# Patient Record
Sex: MALE | Race: OTHER | NOT HISPANIC OR LATINO | URBAN - METROPOLITAN AREA
[De-identification: names, ages, dates, MRNs, and addresses within clinical notes are randomized per-mention and may not be internally consistent; named-entity substitution may affect disease eponyms.]

---

## 2024-08-20 ENCOUNTER — HOSPITAL ENCOUNTER (OUTPATIENT)
Dept: RADIOLOGY | Facility: HOSPITAL | Age: 54
Discharge: HOME | End: 2024-08-20
Attending: PHYSICIAN ASSISTANT
Payer: COMMERCIAL

## 2024-08-20 ENCOUNTER — OFFICE VISIT (OUTPATIENT)
Dept: PRIMARY CARE CLINIC | Facility: CLINIC | Age: 54
End: 2024-08-20
Payer: COMMERCIAL

## 2024-08-20 VITALS
HEIGHT: 66 IN | DIASTOLIC BLOOD PRESSURE: 64 MMHG | OXYGEN SATURATION: 99 % | WEIGHT: 134.94 LBS | HEART RATE: 64 BPM | SYSTOLIC BLOOD PRESSURE: 98 MMHG | BODY MASS INDEX: 21.69 KG/M2

## 2024-08-20 DIAGNOSIS — J18.9 PNEUMONIA SYMPTOMS: ICD-10-CM

## 2024-08-20 DIAGNOSIS — F17.200 TOBACCO DEPENDENCE: ICD-10-CM

## 2024-08-20 DIAGNOSIS — J06.9 UPPER RESPIRATORY TRACT INFECTION, UNSPECIFIED TYPE: Primary | ICD-10-CM

## 2024-08-20 DIAGNOSIS — R53.83 FATIGUE, UNSPECIFIED TYPE: ICD-10-CM

## 2024-08-20 DIAGNOSIS — J45.909 REACTIVE AIRWAY DISEASE WITHOUT COMPLICATION, UNSPECIFIED ASTHMA SEVERITY, UNSPECIFIED WHETHER PERSISTENT: ICD-10-CM

## 2024-08-20 LAB
CTP QC/QA: YES
SARS-COV-2 RDRP RESP QL NAA+PROBE: NEGATIVE

## 2024-08-20 PROCEDURE — 99203 OFFICE O/P NEW LOW 30 MIN: CPT | Mod: S$GLB,,, | Performed by: PHYSICIAN ASSISTANT

## 2024-08-20 PROCEDURE — 3008F BODY MASS INDEX DOCD: CPT | Mod: CPTII,S$GLB,, | Performed by: PHYSICIAN ASSISTANT

## 2024-08-20 PROCEDURE — 87635 SARS-COV-2 COVID-19 AMP PRB: CPT | Mod: QW,S$GLB,, | Performed by: PHYSICIAN ASSISTANT

## 2024-08-20 PROCEDURE — 71046 X-RAY EXAM CHEST 2 VIEWS: CPT | Mod: TC,FY,PO

## 2024-08-20 PROCEDURE — 71046 X-RAY EXAM CHEST 2 VIEWS: CPT | Mod: 26,,, | Performed by: RADIOLOGY

## 2024-08-20 PROCEDURE — 1159F MED LIST DOCD IN RCRD: CPT | Mod: CPTII,S$GLB,, | Performed by: PHYSICIAN ASSISTANT

## 2024-08-20 PROCEDURE — 3074F SYST BP LT 130 MM HG: CPT | Mod: CPTII,S$GLB,, | Performed by: PHYSICIAN ASSISTANT

## 2024-08-20 PROCEDURE — 3078F DIAST BP <80 MM HG: CPT | Mod: CPTII,S$GLB,, | Performed by: PHYSICIAN ASSISTANT

## 2024-08-20 RX ORDER — LEVOFLOXACIN 750 MG/1
750 TABLET ORAL DAILY
Qty: 10 TABLET | Refills: 0 | Status: SHIPPED | OUTPATIENT
Start: 2024-08-20 | End: 2024-08-30

## 2024-08-20 RX ORDER — PREDNISONE 10 MG/1
TABLET ORAL
Qty: 18 TABLET | Refills: 0 | Status: SHIPPED | OUTPATIENT
Start: 2024-08-20

## 2024-08-20 NOTE — PROGRESS NOTES
Subjective     Patient ID: Cheo Robertson is a 54 y.o. male.    Chief Complaint: Fever (Pt states he has had fever and chills for 3 days/), Fatigue (Pt states he has been very fatigue for 3 days), and Pain (Pt states he has pain in his lung)    Fever   This is a new problem. The current episode started 3 days ago. The problem occurs constantly. The problem has been unchanged. He has not experienced a heat injury.The maximum temperature noted was 102 to 102.9 F. The temperature was taken using an oral thermometer. Associated symptoms include coughing, nausea, sleepiness and wheezing. Pertinent negatives include no abdominal pain, chest pain, congestion, sore throat or urinary pain. He has tried acetaminophen for the symptoms.   Fatigue  Associated symptoms include coughing, fatigue, a fever and nausea. Pertinent negatives include no abdominal pain, chest pain, congestion or sore throat.   Pain  Associated symptoms include coughing, fatigue, a fever and nausea. Pertinent negatives include no abdominal pain, chest pain, congestion or sore throat. The symptoms are aggravated by smoking. He has tried rest for the symptoms. The treatment provided no relief.     Review of Systems   Constitutional:  Positive for fatigue and fever.   HENT:  Negative for nasal congestion and sore throat.    Respiratory:  Positive for cough, shortness of breath and wheezing.    Cardiovascular:  Negative for chest pain.   Gastrointestinal:  Positive for nausea. Negative for abdominal pain.          Objective     Physical Exam  Vitals reviewed.   Constitutional:       Appearance: Normal appearance. He is diaphoretic.   HENT:      Head: Normocephalic.      Right Ear: Tympanic membrane, ear canal and external ear normal. There is no impacted cerumen.      Left Ear: Tympanic membrane, ear canal and external ear normal. There is no impacted cerumen.      Nose: Nose normal. No congestion or rhinorrhea.      Mouth/Throat:      Mouth: Mucous membranes  are moist.      Pharynx: No oropharyngeal exudate.   Cardiovascular:      Rate and Rhythm: Normal rate and regular rhythm.      Pulses: Normal pulses.      Heart sounds: Normal heart sounds.   Pulmonary:      Breath sounds: Wheezing and rhonchi present.   Abdominal:      General: Abdomen is flat. There is no distension.      Palpations: Abdomen is soft.      Tenderness: There is no abdominal tenderness.   Neurological:      Mental Status: He is alert.            Assessment and Plan     1. Upper respiratory tract infection, unspecified type  -     POCT COVID-19 Rapid Screening    2. Pneumonia symptoms  -     Comprehensive Metabolic Panel; Future; Expected date: 08/20/2024  -     CBC Auto Differential; Future; Expected date: 08/20/2024  -     X-Ray Chest PA And Lateral; Future; Expected date: 08/20/2024    3. Fatigue, unspecified type  -     T3, Free; Future; Expected date: 08/20/2024  -     T4, Free; Future; Expected date: 08/20/2024  -     TSH; Future; Expected date: 08/20/2024    4. Reactive airway disease without complication, unspecified asthma severity, unspecified whether persistent    5. Tobacco dependence    Other orders  -     levoFLOXacin (LEVAQUIN) 750 MG tablet; Take 1 tablet (750 mg total) by mouth once daily. for 10 days  Dispense: 10 tablet; Refill: 0  -     predniSONE (DELTASONE) 10 MG tablet; Take 3 daily for 3 days, then 2 daily for three days, then 1 daily for three days.  Dispense: 18 tablet; Refill: 0        I spent 30 minutes on this encounter, time includes face-to-face, chart review, documentation, test review and orders.           No follow-ups on file.

## 2024-08-23 ENCOUNTER — TELEPHONE (OUTPATIENT)
Dept: FAMILY MEDICINE | Facility: CLINIC | Age: 54
End: 2024-08-23
Payer: COMMERCIAL

## 2024-08-23 NOTE — TELEPHONE ENCOUNTER
----- Message from Warren Paredes III, PA-C sent at 8/22/2024  7:10 AM CDT -----  I reviewed the lab work. The WBC was significantly elevated. If flores is not feeling significantly better, I recommend that he get checked out in the ER. Otherwise follow up with me in 7 days as recommended. I attempted to call him at 0700 08/22/2024, but no answer.

## 2024-08-23 NOTE — TELEPHONE ENCOUNTER
Advised Mr. Robertson per DWAIN Paredes -     I reviewed the lab work. The WBC was significantly elevated. If flores is not feeling significantly better, I recommend that he get checked out in the ER. Otherwise follow up with me in 7 days as recommended. Pt ANA.    Also I resent a new link through a text message for his MyChart so pt could get to his results to show his brother who's a Pathologist.